# Patient Record
Sex: FEMALE | Race: WHITE
[De-identification: names, ages, dates, MRNs, and addresses within clinical notes are randomized per-mention and may not be internally consistent; named-entity substitution may affect disease eponyms.]

---

## 2018-05-08 ENCOUNTER — HOSPITAL ENCOUNTER (EMERGENCY)
Dept: HOSPITAL 47 - EC | Age: 43
Discharge: HOME | End: 2018-05-08
Payer: COMMERCIAL

## 2018-05-08 VITALS
TEMPERATURE: 98.5 F | DIASTOLIC BLOOD PRESSURE: 83 MMHG | HEART RATE: 95 BPM | SYSTOLIC BLOOD PRESSURE: 139 MMHG | RESPIRATION RATE: 18 BRPM

## 2018-05-08 DIAGNOSIS — F32.9: ICD-10-CM

## 2018-05-08 DIAGNOSIS — F17.200: ICD-10-CM
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
## 2018-05-08 DIAGNOSIS — Z86.14: ICD-10-CM

## 2018-05-08 DIAGNOSIS — Z88.0: ICD-10-CM

## 2018-05-08 DIAGNOSIS — Z79.899: ICD-10-CM

## 2018-05-08 DIAGNOSIS — J32.9: ICD-10-CM

## 2018-05-08 DIAGNOSIS — J02.9: Primary | ICD-10-CM

## 2018-05-08 DIAGNOSIS — S02.5XXA: ICD-10-CM

## 2018-05-08 DIAGNOSIS — R23.8: ICD-10-CM

## 2018-05-08 PROCEDURE — 99283 EMERGENCY DEPT VISIT LOW MDM: CPT

## 2018-05-08 NOTE — ED
ENT HPI





- General


Chief complaint: ENT


Stated complaint: swollen throat


Time Seen by Provider: 18 22:13


Source: patient, RN notes reviewed, old records reviewed


Mode of arrival: ambulatory


Limitations: no limitations





- History of Present Illness


Initial comments: 





43-year-old female presents today to complete of sore throat for the past 

week.. She denies any dental pain but does have some broken teeth. She's 

concerned because her lymphnodes  of been very swollen and her neck. Reports 

mild fevers often on. Complains of sinus congestion. She also reports that she 

has some lesions over her face.Patient denies any chest pain, shortness of 

breath, nausea, vomiting, abdominal pain, headache, changes in urination or 

Bowel habits





- Related Data


 Home Medications











 Medication  Instructions  Recorded  Confirmed


 


ARIPiprazole [Abilify] 2.5 mg PO HS 03/03/15 02/27/17


 


Citalopram Hydrobromide [CeleXA] 20 mg PO BID 03/03/15 02/27/17


 


Dextroamphetamine/Amphetamine 30 mg PO BID 17





[Adderall]   


 


ARIPiprazole [Abilify] 2.5 mg PO DAILY 18


 


Albuterol Sulfate [Proair Hfa] 1 - 2 puff INHALATION RT-Q6H PRN 18


 


Citalopram Hydrobromide [CeleXA] 20 mg PO DAILY 18


 


Dextroamphetamine/Amphetamine 30 mg PO BID 18





[Adderall]   


 


Unknown Medication 50 mg PO DAILY 18








 Previous Rx's











 Medication  Instructions  Recorded


 


medroxyPROGESTERone [Provera] 10 mg PO DAILY #40 tablet 17


 


Cephalexin [Keflex] 500 mg PO Q8HR #30 cap 18











 Allergies











Allergy/AdvReac Type Severity Reaction Status Date / Time


 


Penicillins Allergy  Itching Verified 17 20:11














Review of Systems


ROS Statement: 


Those systems with pertinent positive or pertinent negative responses have been 

documented in the HPI.





ROS Other: All systems not noted in ROS Statement are negative.





Past Medical History


Past Medical History: No Reported History


History of Any Multi-Drug Resistant Organisms: MRSA


Date of last positivie culture/infection: 


MDRO Source:: neck


Past Surgical History:  Section


Past Psychological History: Depression


Smoking Status: Current every day smoker


Past Alcohol Use History: Occasional


Past Drug Use History: None Reported





- Past Family History


  ** Mother


Additional Family Medical History / Comment(s): Schizophrenia





General Exam





- General Exam Comments


Initial Comments: 





43 yemacrina potts female, no distress. 


Limitations: no limitations


General appearance: alert, in no apparent distress


Head exam: Present: atraumatic, normocephalic, normal inspection


Eye exam: Present: normal appearance, PERRL, EOMI.  Absent: scleral icterus, 

conjunctival injection, periorbital swelling


ENT exam: Present: normal exam, mucous membranes moist, other (small oimple 

lesions over face, chin, and cheeks. ).  Absent: normal oropharynx (erythema)


Neck exam: Present: normal inspection, lymphadenopathy (tender tonsilar and 

anterior cervical lymphadenopathy..).  Absent: tenderness, meningismus


Respiratory exam: Present: normal lung sounds bilaterally.  Absent: respiratory 

distress, wheezes, rales, rhonchi, stridor


Cardiovascular Exam: Present: regular rate, normal rhythm, normal heart sounds.

  Absent: systolic murmur, diastolic murmur, rubs, gallop, clicks


GI/Abdominal exam: Present: soft, normal bowel sounds.  Absent: distended, 

tenderness, guarding, rebound, rigid


Neurological exam: Present: alert, oriented X3, CN II-XII intact


Psychiatric exam: Present: normal affect, normal mood





Course


 Vital Signs











  18





  22:03


 


Temperature 98.5 F


 


Pulse Rate 95


 


Respiratory 18





Rate 


 


Blood Pressure 139/83


 


O2 Sat by Pulse 99





Oximetry 














Medical Decision Making





- Medical Decision Making


43-year-old female presents today to complete of sore throat for the past 

week.. She denies any dental pain but does have some broken teeth. She's 

concerned because her lymphnodes  of been very swollen and her neck. Reports 

mild fevers often on. Complains of sinus congestion. Patient has tender 

adenopathy, and erythematous oropharynx. Discussed OTC sinus medications.Will 

place patient on keflex, allergic to penicillin for sinus infection and 

pharyngitis. All questions answered and return parameters discussed. 








Disposition


Clinical Impression: 


 Pharyngitis, Cervical adenopathy





Disposition: HOME SELF-CARE


Condition: Good


Instructions:  Nosebleed (ED)


Additional Instructions: 


Patient advised to follow-up with primary care provider.  Take the meds, and 

return to ED if any alarming sign or symptoms occur. 


Prescriptions: 


Cephalexin [Keflex] 500 mg PO Q8HR #30 cap


Is patient prescribed a controlled substance at d/c from ED?: No


If prescribed controlled substance>3 days was MAPS reviewed?: No


When asked, does pt state using other controlled substances?: No


Referrals: 


Alejo Reece MD [Primary Care Provider] - 1-2 days


Time of Disposition: 22:27

## 2018-05-30 ENCOUNTER — HOSPITAL ENCOUNTER (EMERGENCY)
Dept: HOSPITAL 47 - EC | Age: 43
Discharge: HOME | End: 2018-05-30
Payer: COMMERCIAL

## 2018-05-30 VITALS — TEMPERATURE: 99.4 F

## 2018-05-30 VITALS — RESPIRATION RATE: 15 BRPM | HEART RATE: 58 BPM | SYSTOLIC BLOOD PRESSURE: 135 MMHG | DIASTOLIC BLOOD PRESSURE: 78 MMHG

## 2018-05-30 DIAGNOSIS — F31.9: ICD-10-CM

## 2018-05-30 DIAGNOSIS — F41.9: ICD-10-CM

## 2018-05-30 DIAGNOSIS — Z88.0: ICD-10-CM

## 2018-05-30 DIAGNOSIS — N83.201: ICD-10-CM

## 2018-05-30 DIAGNOSIS — Z86.14: ICD-10-CM

## 2018-05-30 DIAGNOSIS — N92.0: Primary | ICD-10-CM

## 2018-05-30 DIAGNOSIS — F17.200: ICD-10-CM

## 2018-05-30 DIAGNOSIS — Z79.899: ICD-10-CM

## 2018-05-30 LAB
ALBUMIN SERPL-MCNC: 3.9 G/DL (ref 3.5–5)
ALP SERPL-CCNC: 65 U/L (ref 38–126)
ALT SERPL-CCNC: 35 U/L (ref 9–52)
ANION GAP SERPL CALC-SCNC: 11 MMOL/L
APTT BLD: 23.7 SEC (ref 22–30)
AST SERPL-CCNC: 28 U/L (ref 14–36)
BASOPHILS # BLD AUTO: 0 K/UL (ref 0–0.2)
BASOPHILS NFR BLD AUTO: 1 %
BUN SERPL-SCNC: 16 MG/DL (ref 7–17)
CALCIUM SPEC-MCNC: 9.2 MG/DL (ref 8.4–10.2)
CHLORIDE SERPL-SCNC: 108 MMOL/L (ref 98–107)
CO2 SERPL-SCNC: 23 MMOL/L (ref 22–30)
EOSINOPHIL # BLD AUTO: 0.2 K/UL (ref 0–0.7)
EOSINOPHIL NFR BLD AUTO: 3 %
ERYTHROCYTE [DISTWIDTH] IN BLOOD BY AUTOMATED COUNT: 4.14 M/UL (ref 3.8–5.4)
ERYTHROCYTE [DISTWIDTH] IN BLOOD: 15 % (ref 11.5–15.5)
GLUCOSE SERPL-MCNC: 102 MG/DL (ref 74–99)
HCT VFR BLD AUTO: 36.4 % (ref 34–46)
HGB BLD-MCNC: 12.1 GM/DL (ref 11.4–16)
INR PPP: 1 (ref ?–1.2)
LYMPHOCYTES # SPEC AUTO: 2.3 K/UL (ref 1–4.8)
LYMPHOCYTES NFR SPEC AUTO: 35 %
MCH RBC QN AUTO: 29.3 PG (ref 25–35)
MCHC RBC AUTO-ENTMCNC: 33.4 G/DL (ref 31–37)
MCV RBC AUTO: 87.9 FL (ref 80–100)
MONOCYTES # BLD AUTO: 0.4 K/UL (ref 0–1)
MONOCYTES NFR BLD AUTO: 5 %
NEUTROPHILS # BLD AUTO: 3.7 K/UL (ref 1.3–7.7)
NEUTROPHILS NFR BLD AUTO: 55 %
PLATELET # BLD AUTO: 325 K/UL (ref 150–450)
POTASSIUM SERPL-SCNC: 4.3 MMOL/L (ref 3.5–5.1)
PROT SERPL-MCNC: 6.4 G/DL (ref 6.3–8.2)
PT BLD: 9.5 SEC (ref 9–12)
SODIUM SERPL-SCNC: 142 MMOL/L (ref 137–145)
WBC # BLD AUTO: 6.8 K/UL (ref 3.8–10.6)

## 2018-05-30 PROCEDURE — 36415 COLL VENOUS BLD VENIPUNCTURE: CPT

## 2018-05-30 PROCEDURE — 86901 BLOOD TYPING SEROLOGIC RH(D): CPT

## 2018-05-30 PROCEDURE — 85025 COMPLETE CBC W/AUTO DIFF WBC: CPT

## 2018-05-30 PROCEDURE — 99285 EMERGENCY DEPT VISIT HI MDM: CPT

## 2018-05-30 PROCEDURE — 93975 VASCULAR STUDY: CPT

## 2018-05-30 PROCEDURE — 76830 TRANSVAGINAL US NON-OB: CPT

## 2018-05-30 PROCEDURE — 86900 BLOOD TYPING SEROLOGIC ABO: CPT

## 2018-05-30 PROCEDURE — 96374 THER/PROPH/DIAG INJ IV PUSH: CPT

## 2018-05-30 PROCEDURE — 96361 HYDRATE IV INFUSION ADD-ON: CPT

## 2018-05-30 PROCEDURE — 85610 PROTHROMBIN TIME: CPT

## 2018-05-30 PROCEDURE — 86850 RBC ANTIBODY SCREEN: CPT

## 2018-05-30 PROCEDURE — 96375 TX/PRO/DX INJ NEW DRUG ADDON: CPT

## 2018-05-30 PROCEDURE — 80053 COMPREHEN METABOLIC PANEL: CPT

## 2018-05-30 PROCEDURE — 85730 THROMBOPLASTIN TIME PARTIAL: CPT

## 2018-05-30 PROCEDURE — 81025 URINE PREGNANCY TEST: CPT

## 2018-05-30 NOTE — US
EXAMINATION TYPE: US transvaginal

 

DATE OF EXAM: 5/30/2018

 

COMPARISON: NONE

 

CLINICAL HISTORY: pain. Bleeding x 3 weeks.

 

TECHNIQUE:  Transvaginal (TV).   

 

 

EXAM MEASUREMENTS:

 

Uterus:  8.4 x 4.1 x 4.8  cm

Endometrial Stripe: 0.9 cm

Right Ovary:  4..8 x 3.6 x 3.8  cm

Left Ovary:  2.4 x 1.5 x 2.4  cm

 

 

 

1. Uterus:  Anteverted   Nabothian cysts seen

2. Endometrium:  wnl

3. Right Ovary:  Cystic area seen measuring 3.5 x 3.3 x 2.8 cm

4. Left Ovary:  wnl

**Spectral, color and waveform doppler imaging shows good arterial and venous flow within the ovaries
; there is no evidence for ovarian torsion.

5. Bilateral Adnexa:  wnl

6. Posterior cul-de-sac:  wnl

 

Right ovarian cyst seen measuring 3.5 x 3.3 x 2.8 cm.

 

IMPRESSION: 

1. Simple appearing right ovarian cyst measuring approximately 3.3 cm. Follow-up following the next n
ormal menstrual period or 6 weeks is recommended.

## 2018-05-30 NOTE — ED
Female Urogenital HPI





- General


Chief complaint: Vaginal Bleeding


Stated complaint: Vaginal Bleeding


Time Seen by Provider: 18 19:27


Source: patient, RN notes reviewed, old records reviewed


Mode of arrival: ambulatory


Limitations: no limitations





- History of Present Illness


Initial comments: 





This patient's a 43-year-old female chief complaint of lower abdominal pain and 

heavy menstrual bleeding for the past 3 weeks.  Patient states she was seen by 

her primary care provider and urged to come to the emergency department earlier 

today.  She states that she's been tired and fatigued.  She reports that she's 

been taking Motrin.  Denies any urinary symptoms, denies any changes in stools.

  Denies any fever or chills.  Patient states that she has not seen an OB/GYN.  

Patient ports that she's been going through multiple pads and tampons.





- Related Data


 Home Medications











 Medication  Instructions  Recorded  Confirmed


 


ARIPiprazole [Abilify] 2.5 mg PO HS 03/03/15 05/30/18


 


Albuterol Sulfate [Proair Hfa] 1 - 2 puff INHALATION RT-Q6H PRN 18


 


Dextroamphetamine/Amphetamine 30 mg PO BID 18





[Adderall]   


 


Desvenlafaxine Succinate [Pristiq] 50 mg PO DAILY 18








 Previous Rx's











 Medication  Instructions  Recorded


 


traMADol HCL [Ultram] 50 mg PO Q6HR PRN 3 Days #12 tab 18











 Allergies











Allergy/AdvReac Type Severity Reaction Status Date / Time


 


Penicillins Allergy  Itching Verified 18 19:57














Review of Systems


ROS Statement: 


Those systems with pertinent positive or pertinent negative responses have been 

documented in the HPI.





ROS Other: All systems not noted in ROS Statement are negative.





Past Medical History


Past Medical History: No Reported History


Additional Past Medical History / Comment(s): VAGINAL BLEEDING SINCE , 

ULTRASOUND DONE AND SHOWED CYST


History of Any Multi-Drug Resistant Organisms: MRSA


Date of last positivie culture/infection: 


MDRO Source:: neck


Past Surgical History:  Section, Tubal Ligation


Past Anesthesia/Blood Transfusion Reactions: No Reported Reaction


Past Psychological History: Bipolar, Depression


Smoking Status: Current every day smoker


Past Alcohol Use History: Rare


Past Drug Use History: None Reported





- Past Family History


  ** Mother


Additional Family Medical History / Comment(s): Schizophrenia





General Exam





- General Exam Comments


Initial Comments: 





43-year-old female.  Alert and oriented.  No significant distress.


Limitations: no limitations


General appearance: alert, in no apparent distress


Head exam: Present: atraumatic, normocephalic, normal inspection


Eye exam: Present: normal appearance, PERRL, EOMI.  Absent: scleral icterus, 

conjunctival injection, periorbital swelling


ENT exam: Present: normal exam, mucous membranes moist


Neck exam: Present: normal inspection.  Absent: tenderness, meningismus, 

lymphadenopathy


Respiratory exam: Present: normal lung sounds bilaterally.  Absent: respiratory 

distress, wheezes, rales, rhonchi, stridor


Cardiovascular Exam: Present: regular rate, normal rhythm, normal heart sounds.

  Absent: systolic murmur, diastolic murmur, rubs, gallop, clicks


GI/Abdominal exam: Present: soft, tenderness (Pelvic tenderness.), normal bowel 

sounds.  Absent: distended, guarding, rebound, rigid


Extremities exam: Present: normal inspection, full ROM, normal capillary 

refill.  Absent: tenderness, pedal edema, joint swelling, calf tenderness


Back exam: Present: normal inspection


Neurological exam: Present: alert, oriented X3, CN II-XII intact


Psychiatric exam: Present: normal affect, normal mood


Skin exam: Present: warm, dry, intact, normal color.  Absent: rash





Course


 Vital Signs











  18





  19:04


 


Temperature 99.4 F


 


Pulse Rate 83


 


Respiratory 18





Rate 


 


Blood Pressure 141/88


 


O2 Sat by Pulse 95





Oximetry 














Medical Decision Making





- Medical Decision Making





43-year-old female presents emergency with lower abdominal pain cramping.  She 

states she's been having heavy bleeding for the past 2 weeks.  Sent in by PCP 

for further evaluation.  At this time patient's hemoglobin is stable at 12.1.  

Ultrasound of the pelvis completed.  She has no significant thickening or 

fibroids.  There is a right ovarian cyst.  Patient does not have an OB/GYN.  

Patient informed of all his results.  I discussed that she can follow-up with 

her primary care provider again and OB/GYN.  All questions answered return 

parameters were discussed.





- Lab Data


Result diagrams: 


 18 19:21





 18 19:21


 Lab Results











  18 Range/Units





  19:21 19:21 19:21 


 


WBC  6.8    (3.8-10.6)  k/uL


 


RBC  4.14    (3.80-5.40)  m/uL


 


Hgb  12.1    (11.4-16.0)  gm/dL


 


Hct  36.4    (34.0-46.0)  %


 


MCV  87.9    (80.0-100.0)  fL


 


MCH  29.3    (25.0-35.0)  pg


 


MCHC  33.4    (31.0-37.0)  g/dL


 


RDW  15.0    (11.5-15.5)  %


 


Plt Count  325    (150-450)  k/uL


 


Neutrophils %  55    %


 


Lymphocytes %  35    %


 


Monocytes %  5    %


 


Eosinophils %  3    %


 


Basophils %  1    %


 


Neutrophils #  3.7    (1.3-7.7)  k/uL


 


Lymphocytes #  2.3    (1.0-4.8)  k/uL


 


Monocytes #  0.4    (0-1.0)  k/uL


 


Eosinophils #  0.2    (0-0.7)  k/uL


 


Basophils #  0.0    (0-0.2)  k/uL


 


PT     (9.0-12.0)  sec


 


INR     (<1.2)  


 


APTT     (22.0-30.0)  sec


 


Sodium   142   (137-145)  mmol/L


 


Potassium   4.3   (3.5-5.1)  mmol/L


 


Chloride   108 H   ()  mmol/L


 


Carbon Dioxide   23   (22-30)  mmol/L


 


Anion Gap   11   mmol/L


 


BUN   16   (7-17)  mg/dL


 


Creatinine   0.57   (0.52-1.04)  mg/dL


 


Est GFR (CKD-EPI)AfAm   >90   (>60 ml/min/1.73 sqM)  


 


Est GFR (CKD-EPI)NonAf   >90   (>60 ml/min/1.73 sqM)  


 


Glucose   102 H   (74-99)  mg/dL


 


Calcium   9.2   (8.4-10.2)  mg/dL


 


Total Bilirubin   0.3   (0.2-1.3)  mg/dL


 


AST   28   (14-36)  U/L


 


ALT   35   (9-52)  U/L


 


Alkaline Phosphatase   65   ()  U/L


 


Total Protein   6.4   (6.3-8.2)  g/dL


 


Albumin   3.9   (3.5-5.0)  g/dL


 


Urine HCG, Qual    Not Detected  (Not Detectd)  


 


Blood Type     


 


Blood Type Recheck     


 


Antibody Screen     


 


Spec Expiration Date     














  18 Range/Units





  19:21 19:21 


 


WBC    (3.8-10.6)  k/uL


 


RBC    (3.80-5.40)  m/uL


 


Hgb    (11.4-16.0)  gm/dL


 


Hct    (34.0-46.0)  %


 


MCV    (80.0-100.0)  fL


 


MCH    (25.0-35.0)  pg


 


MCHC    (31.0-37.0)  g/dL


 


RDW    (11.5-15.5)  %


 


Plt Count    (150-450)  k/uL


 


Neutrophils %    %


 


Lymphocytes %    %


 


Monocytes %    %


 


Eosinophils %    %


 


Basophils %    %


 


Neutrophils #    (1.3-7.7)  k/uL


 


Lymphocytes #    (1.0-4.8)  k/uL


 


Monocytes #    (0-1.0)  k/uL


 


Eosinophils #    (0-0.7)  k/uL


 


Basophils #    (0-0.2)  k/uL


 


PT  9.5   (9.0-12.0)  sec


 


INR  1.0   (<1.2)  


 


APTT  23.7   (22.0-30.0)  sec


 


Sodium    (137-145)  mmol/L


 


Potassium    (3.5-5.1)  mmol/L


 


Chloride    ()  mmol/L


 


Carbon Dioxide    (22-30)  mmol/L


 


Anion Gap    mmol/L


 


BUN    (7-17)  mg/dL


 


Creatinine    (0.52-1.04)  mg/dL


 


Est GFR (CKD-EPI)AfAm    (>60 ml/min/1.73 sqM)  


 


Est GFR (CKD-EPI)NonAf    (>60 ml/min/1.73 sqM)  


 


Glucose    (74-99)  mg/dL


 


Calcium    (8.4-10.2)  mg/dL


 


Total Bilirubin    (0.2-1.3)  mg/dL


 


AST    (14-36)  U/L


 


ALT    (9-52)  U/L


 


Alkaline Phosphatase    ()  U/L


 


Total Protein    (6.3-8.2)  g/dL


 


Albumin    (3.5-5.0)  g/dL


 


Urine HCG, Qual    (Not Detectd)  


 


Blood Type   A Positive  


 


Blood Type Recheck   No  


 


Antibody Screen   NEGATIVE  


 


Spec Expiration Date   2018 - 2321  














- Radiology Data


Radiology results: report reviewed


Simple appearing right ovarian cyst measuring 3.37 m.  Follow-up following the


Mental.  Her 6 weeks is recommended.  Uterus is a 0.4 x 4.1 x 4.8 cm.  





Disposition


Clinical Impression: 


 Menorrhagia, Pelvic pain





Disposition: HOME SELF-CARE


Condition: Good


Instructions:  Dysmenorrhea (ED)


Additional Instructions: 


Patient has follow-up with primary care provider.  And OB/GYN.  Return to 

emergency department if any alarming signs or symptoms occur.


Prescriptions: 


traMADol HCL [Ultram] 50 mg PO Q6HR PRN 3 Days #12 tab


 PRN Reason: Pain


Is patient prescribed a controlled substance at d/c from ED?: No


When asked, does pt state using other controlled substances?: No


If prescribed controlled substance>3 days was MAPS reviewed?: No


If opioid is for acute pain is fill amount 7 days or less?: No


If Rx opioid, was Start Talking consent form obtained?: No


Referrals: 


Alejo Reece MD [Primary Care Provider] - 1-2 days


Chantale Quiroga MD [STAFF PHYSICIAN] - 1-2 days


Time of Disposition: 20:53

## 2022-09-04 ENCOUNTER — HOSPITAL ENCOUNTER (EMERGENCY)
Dept: HOSPITAL 47 - EC | Age: 47
Discharge: HOME | End: 2022-09-04
Payer: COMMERCIAL

## 2022-09-04 VITALS — TEMPERATURE: 97.8 F | DIASTOLIC BLOOD PRESSURE: 91 MMHG | HEART RATE: 74 BPM | SYSTOLIC BLOOD PRESSURE: 162 MMHG

## 2022-09-04 VITALS — RESPIRATION RATE: 18 BRPM

## 2022-09-04 DIAGNOSIS — S16.1XXA: Primary | ICD-10-CM

## 2022-09-04 DIAGNOSIS — F17.200: ICD-10-CM
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
## 2022-09-04 DIAGNOSIS — Z88.0: ICD-10-CM

## 2022-09-04 PROCEDURE — 72125 CT NECK SPINE W/O DYE: CPT

## 2022-09-04 PROCEDURE — 99283 EMERGENCY DEPT VISIT LOW MDM: CPT

## 2022-09-04 PROCEDURE — 96372 THER/PROPH/DIAG INJ SC/IM: CPT

## 2022-09-04 NOTE — ED
General Adult HPI





- General


Chief complaint: Neck Pain/Injury


Stated complaint: Neck Pain and stiffness


Time Seen by Provider: 22 01:05


Source: patient, RN notes reviewed, old records reviewed


Mode of arrival: ambulatory


Limitations: no limitations





- History of Present Illness


Initial comments: 





Patient is a 47-year-old female.  No significant past medical history.  Presents

in the department complaining of bilateral neck pain.  She has to carry a heavy 

backpack at a new job that she just started over the last week or so.  She is 

also been sleeping on the ground at her mother's house rather than in the bed.  

Over this period of time, she has noticed tension a pulling sensation primarily 

over the right shoulder that radiates the muscles of her neck to the base of her

skull on the right side and down towards her back.  States it hurts with 

movement of the neck.  Denies any midline spine bony pain.  Denies any trauma.  

His concern she may have a muscle strain.  Denies any lower extremity symptoms. 

Denies any weakness.  She does endorse subjective paresthesias in the bilateral 

fingertips..  Denies chest pain, shortness breath, abdominal pain, nausea, 

vomiting.  Presents for further evaluation at this time over concern for neck 

muscle strain.





- Related Data


                                Home Medications











 Medication  Instructions  Recorded  Confirmed


 


ARIPiprazole [Abilify] 2.5 mg PO HS 03/03/15 05/30/18


 


Albuterol Sulfate [Proair Hfa] 1 - 2 puff INHALATION RT-Q6H PRN 18


 


Dextroamphetamine/Amphetamine 30 mg PO BID 18





[Adderall]   


 


Desvenlafaxine Succinate [Pristiq] 50 mg PO DAILY 18








                                  Previous Rx's











 Medication  Instructions  Recorded


 


traMADol HCL [Ultram] 50 mg PO Q6HR PRN 3 Days #12 tab 18


 


Lidocaine 5% Patch [Lidoderm 5% 1 patch TOPICAL DAILY PRN 7 Days 22





Patch] #7 patch 


 


methocarbamoL [Robaxin-750] 750 mg PO BID PRN 7 Days #14 tab 22











                                    Allergies











Allergy/AdvReac Type Severity Reaction Status Date / Time


 


Penicillins Allergy  Itching Verified 18 19:57














Review of Systems


ROS Statement: 


Those systems with pertinent positive or pertinent negative responses have been 

documented in the HPI.


Review of Systems:


CONST: Denies fever 


EYES: Denies blurry vision 


ENT: Denies nasal congestion  


C/V:  Denies Chest pain


RESP: Denies shortness of breath 


GI: Denies abdominal pain 


: Denies dysuria  


SKIN: Denies rash.


MSK: Endorses neck muscle pain.


NEURO: Denies headache 


ROS Other: All systems not noted in ROS Statement are negative.





Past Medical History


Past Medical History: No Reported History


Additional Past Medical History / Comment(s): VAGINAL BLEEDING SINCE , 

ULTRASOUND DONE AND SHOWED CYST


History of Any Multi-Drug Resistant Organisms: MRSA


Date of last positivie culture/infection: 


MDRO Source:: neck


Past Surgical History:  Section, Tubal Ligation


Past Anesthesia/Blood Transfusion Reactions: No Reported Reaction


Past Psychological History: Bipolar, Depression


Smoking Status: Current every day smoker


Past Alcohol Use History: Rare


Past Drug Use History: None Reported





- Past Family History


  ** Mother


Additional Family Medical History / Comment(s): Schizophrenia





General Exam





- General Exam Comments


Initial Comments: 





General: Appears in mild distress secondary to pain in her neck.


HEAD:  Normal with no signs of head trauma.


EYES:  PERRLA, EOMI, conjunctiva normal, no discharge.


ENT:  Hearing grossly intact, normal oropharynx.


RESPIRATORY:  Clear breath sounds bilaterally.  No wheezes, rales, or rhonchi.  


C/V:  Regular rate and rhythm. S1 and S2 auscultated, no edema, peripheral 

pulses 2+ and intact throughout


ABD: Abdomen is nondistended.


EXT: Reduced range of motion the neck secondary to pain in the bilateral 

trapezius muscles.  Patient is tenderness palpation along the trapezius muscles 

on both the right and left neck.  Right appears to be worse than left.  No 

midline cervical, thoracic, lumbar spine tenderness to palpation.


SKIN:  No rashes or lesions observed on exposed skin.


NEURO: Alert and oriented 4.  No focal sensory or strength deficits.


Limitations: no limitations





Course


                                   Vital Signs











  22





  00:31


 


Temperature 97.8 F


 


Pulse Rate 74


 


Respiratory 16





Rate 


 


Blood Pressure 162/91


 


O2 Sat by Pulse 97





Oximetry 














Medical Decision Making





- Medical Decision Making





Based on the patient's presentation and physical exam, do believe she is likely 

experiencing a neck muscle strain.  We'll obtain CT C-spine and treat her with 

oral Valium an IM Toradol.  Vital signs are within normal limits.  Patient was 

in agreement this plan.





CT cervical spine shows mild degenerative hypertrophic changes.  No fracture 

seen.





On reevaluation, the patient's feeling somewhat improved.  She'll be redosed 

another dose of Valium.  We discussed her imaging.  I will provide with 

prescriptions for home.  She has a cervical/ trapezius strain.  She was in 

agreement with this plan.





I will provide the patient with a prescription for Robaxin, lidocaine patch. I 

instructed the patient to follow up with their PCP in the next 1-3 days.  I 

explained that the patient should return to the emergency department if they 

experience any worsening symptoms. Strict return precautions were discussed with

the patient. The patient expressed understanding of these instructions. I 

answered all questions that the patient had. The patient was discharged home in 

fair condition with their prescriptions and follow up information.





Disposition


Clinical Impression: 


 Neck strain





Disposition: HOME SELF-CARE


Condition: Fair


Instructions (If sedation given, give patient instructions):  Cervical Strain 

(ED)


Prescriptions: 


Lidocaine 5% Patch [Lidoderm 5% Patch] 1 patch TOPICAL DAILY PRN 7 Days #7 patch


 PRN Reason: Pain


methocarbamoL [Robaxin-750] 750 mg PO BID PRN 7 Days #14 tab


 PRN Reason: Pain


Is patient prescribed a controlled substance at d/c from ED?: No


Referrals: 


Sánchez Tena MD [Primary Care Provider] - 1-2 days


Time of Disposition: 03:15

## 2022-09-04 NOTE — CT
EXAMINATION TYPE: CT cervical spine wo con

 

DATE OF EXAM: 9/4/2022

 

COMPARISON: None

 

HISTORY: Neck pain, no trauma

 

CT DLP: 440.8 mGycm

Automated exposure control for dose reduction was used.

 

Images obtained from the skull base to T1 vertebra with no contrast.

 

 

 

The cervical vertebrae show some mild straightening. Disc spaces are fairly normal. No compression fr
acture. There is anterior spurring at C5-6. The facet joints are intact there is hypertrophic facet a
rthropathy in the upper cervical spine. No evidence of focal bone destruction.

 

IMPRESSION:

Mild degenerative hypertrophic changes. No fracture seen.